# Patient Record
Sex: FEMALE | Race: WHITE | NOT HISPANIC OR LATINO
[De-identification: names, ages, dates, MRNs, and addresses within clinical notes are randomized per-mention and may not be internally consistent; named-entity substitution may affect disease eponyms.]

---

## 2017-11-01 ENCOUNTER — APPOINTMENT (OUTPATIENT)
Dept: OPHTHALMOLOGY | Facility: CLINIC | Age: 62
End: 2017-11-01
Payer: COMMERCIAL

## 2017-11-01 PROCEDURE — 92226: CPT | Mod: LT

## 2017-11-01 PROCEDURE — 92014 COMPRE OPH EXAM EST PT 1/>: CPT

## 2017-11-29 ENCOUNTER — APPOINTMENT (OUTPATIENT)
Dept: ORTHOPEDIC SURGERY | Facility: CLINIC | Age: 62
End: 2017-11-29
Payer: COMMERCIAL

## 2017-11-29 VITALS
WEIGHT: 171 LBS | HEIGHT: 69 IN | DIASTOLIC BLOOD PRESSURE: 78 MMHG | HEART RATE: 82 BPM | BODY MASS INDEX: 25.33 KG/M2 | SYSTOLIC BLOOD PRESSURE: 120 MMHG

## 2017-11-29 DIAGNOSIS — Z82.49 FAMILY HISTORY OF ISCHEMIC HEART DISEASE AND OTHER DISEASES OF THE CIRCULATORY SYSTEM: ICD-10-CM

## 2017-11-29 DIAGNOSIS — Z87.19 PERSONAL HISTORY OF OTHER DISEASES OF THE DIGESTIVE SYSTEM: ICD-10-CM

## 2017-11-29 DIAGNOSIS — Z78.9 OTHER SPECIFIED HEALTH STATUS: ICD-10-CM

## 2017-11-29 DIAGNOSIS — Z80.9 FAMILY HISTORY OF MALIGNANT NEOPLASM, UNSPECIFIED: ICD-10-CM

## 2017-11-29 DIAGNOSIS — Z87.891 PERSONAL HISTORY OF NICOTINE DEPENDENCE: ICD-10-CM

## 2017-11-29 DIAGNOSIS — Z87.09 PERSONAL HISTORY OF OTHER DISEASES OF THE RESPIRATORY SYSTEM: ICD-10-CM

## 2017-11-29 PROCEDURE — 99202 OFFICE O/P NEW SF 15 MIN: CPT

## 2017-11-29 PROCEDURE — 73650 X-RAY EXAM OF HEEL: CPT | Mod: RT

## 2017-11-29 RX ORDER — IBUPROFEN 800 MG
TABLET ORAL
Refills: 0 | Status: ACTIVE | COMMUNITY

## 2017-11-29 RX ORDER — OMEPRAZOLE 40 MG/1
CAPSULE, DELAYED RELEASE ORAL
Refills: 0 | Status: ACTIVE | COMMUNITY

## 2017-11-29 RX ORDER — ESTRADIOL 0.5 MG/.5G
0.5 GEL TOPICAL
Refills: 0 | Status: ACTIVE | COMMUNITY

## 2017-11-29 RX ORDER — ASPIRIN/ACETAMINOPHEN/CAFFEINE 250-250-65
250-250-65 TABLET ORAL
Refills: 0 | Status: ACTIVE | COMMUNITY

## 2018-06-13 ENCOUNTER — APPOINTMENT (OUTPATIENT)
Dept: ORTHOPEDIC SURGERY | Facility: CLINIC | Age: 63
End: 2018-06-13
Payer: COMMERCIAL

## 2018-06-13 PROCEDURE — 99214 OFFICE O/P EST MOD 30 MIN: CPT | Mod: 25

## 2018-06-13 PROCEDURE — 20610 DRAIN/INJ JOINT/BURSA W/O US: CPT | Mod: RT

## 2018-09-04 ENCOUNTER — APPOINTMENT (OUTPATIENT)
Dept: ORTHOPEDIC SURGERY | Facility: CLINIC | Age: 63
End: 2018-09-04
Payer: COMMERCIAL

## 2018-09-04 VITALS — WEIGHT: 175 LBS | BODY MASS INDEX: 25.92 KG/M2 | HEIGHT: 69 IN

## 2018-09-04 DIAGNOSIS — M76.61 ACHILLES TENDINITIS, RIGHT LEG: ICD-10-CM

## 2018-09-04 PROCEDURE — 20611 DRAIN/INJ JOINT/BURSA W/US: CPT | Mod: LT

## 2018-09-04 PROCEDURE — 99214 OFFICE O/P EST MOD 30 MIN: CPT | Mod: 25

## 2018-09-04 RX ORDER — VALACYCLOVIR 1 G/1
1 TABLET, FILM COATED ORAL
Qty: 60 | Refills: 0 | Status: ACTIVE | COMMUNITY
Start: 2017-11-13

## 2018-09-04 RX ORDER — PROGESTERONE 200 MG/1
200 CAPSULE ORAL
Qty: 18 | Refills: 0 | Status: ACTIVE | COMMUNITY
Start: 2017-11-13

## 2018-09-04 RX ORDER — ESTRADIOL 1 MG/G
1 GEL TOPICAL
Qty: 90 | Refills: 0 | Status: ACTIVE | COMMUNITY
Start: 2018-05-23

## 2018-09-04 RX ORDER — PNV NO.95/FERROUS FUM/FOLIC AC 28MG-0.8MG
TABLET ORAL
Refills: 0 | Status: ACTIVE | COMMUNITY

## 2018-09-04 RX ORDER — ALPRAZOLAM 0.5 MG/1
0.5 TABLET ORAL
Qty: 60 | Refills: 0 | Status: ACTIVE | COMMUNITY
Start: 2018-05-23

## 2018-09-04 RX ORDER — ALBUTEROL SULFATE 90 UG/1
108 (90 BASE) AEROSOL, METERED RESPIRATORY (INHALATION)
Qty: 8 | Refills: 0 | Status: ACTIVE | COMMUNITY
Start: 2018-05-23

## 2018-09-04 RX ORDER — MAGNESIUM OXIDE/MAG AA CHELATE 300 MG
CAPSULE ORAL
Refills: 0 | Status: ACTIVE | COMMUNITY

## 2018-09-04 RX ORDER — CHROMIUM 200 MCG
TABLET ORAL
Refills: 0 | Status: ACTIVE | COMMUNITY

## 2018-10-02 ENCOUNTER — APPOINTMENT (OUTPATIENT)
Dept: ORTHOPEDIC SURGERY | Facility: CLINIC | Age: 63
End: 2018-10-02
Payer: COMMERCIAL

## 2018-10-02 VITALS — HEIGHT: 69 IN | BODY MASS INDEX: 25.92 KG/M2 | WEIGHT: 175 LBS

## 2018-10-02 DIAGNOSIS — M75.102 UNSPECIFIED ROTATOR CUFF TEAR OR RUPTURE OF LEFT SHOULDER, NOT SPECIFIED AS TRAUMATIC: ICD-10-CM

## 2018-10-02 PROCEDURE — 99213 OFFICE O/P EST LOW 20 MIN: CPT

## 2019-05-08 ENCOUNTER — APPOINTMENT (OUTPATIENT)
Dept: ORTHOPEDIC SURGERY | Facility: CLINIC | Age: 64
End: 2019-05-08
Payer: COMMERCIAL

## 2019-05-08 PROCEDURE — 20610 DRAIN/INJ JOINT/BURSA W/O US: CPT | Mod: RT

## 2019-05-20 ENCOUNTER — FORM ENCOUNTER (OUTPATIENT)
Age: 64
End: 2019-05-20

## 2019-05-21 ENCOUNTER — OUTPATIENT (OUTPATIENT)
Dept: OUTPATIENT SERVICES | Facility: HOSPITAL | Age: 64
LOS: 1 days | End: 2019-05-21
Payer: COMMERCIAL

## 2019-05-21 ENCOUNTER — APPOINTMENT (OUTPATIENT)
Dept: ORTHOPEDIC SURGERY | Facility: CLINIC | Age: 64
End: 2019-05-21
Payer: COMMERCIAL

## 2019-05-21 VITALS
HEART RATE: 74 BPM | BODY MASS INDEX: 25.48 KG/M2 | DIASTOLIC BLOOD PRESSURE: 70 MMHG | HEIGHT: 69 IN | SYSTOLIC BLOOD PRESSURE: 110 MMHG | OXYGEN SATURATION: 98 % | WEIGHT: 172 LBS

## 2019-05-21 PROCEDURE — 99213 OFFICE O/P EST LOW 20 MIN: CPT

## 2019-05-21 PROCEDURE — 73564 X-RAY EXAM KNEE 4 OR MORE: CPT | Mod: 26,50

## 2019-05-21 PROCEDURE — 73564 X-RAY EXAM KNEE 4 OR MORE: CPT

## 2019-05-21 RX ORDER — MONTELUKAST 10 MG/1
10 TABLET, FILM COATED ORAL
Qty: 90 | Refills: 0 | Status: ACTIVE | COMMUNITY
Start: 2018-12-12

## 2019-05-25 ENCOUNTER — TRANSCRIPTION ENCOUNTER (OUTPATIENT)
Age: 64
End: 2019-05-25

## 2019-06-03 NOTE — DISCUSSION/SUMMARY
[de-identified] : July has persistent pain and swelling despite a cortisone  injection. She will be sent for an MRI to r.o a medial meniscus tear. We will call her with the results. all questions were answered. She will call if any issues arise.

## 2019-06-03 NOTE — HISTORY OF PRESENT ILLNESS
[de-identified] : July returns today for evalaution of her right knee. 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 She had a cortisone injection about three weeks ago. She had some initial relief from the injection but still has persistent right knee pain and stiffness. Her pain is more localized to the medial joint line. She has decreased ROM and pain with stairs. She denies any locking or buckling. She is unable to exercise and has difficulty walking long distances.

## 2019-06-03 NOTE — PHYSICAL EXAM
[de-identified] : The patient is a well developed, well nourished female in no apparent distress. She is alert and oriented X 3 with a pleasant mood and appropriate affect. \par \par On physical examination of the right knee, her ROM is 0-120 degrees. The patient walks with a normal gait and stands in neutral alignment. There is trace effusion. No warmth or erythema is noted. The patella is non tender to palpation medially or laterally. There is no crepitus noted. The apprehension and grind tests are negative. The extensor mechanism is intact. There is medial joint line tenderness. The Shonda sign is positive. The Lachman and pivot shift tests are negative. There is no varus or valgus laxity at 0 or 30 degrees. No posterolateral or anteromedial laxity is noted. No masses are palpable. No other soft tissue or bony tenderness is noted. Quadriceps weakness is noted. Neurovascular function is intact.   [de-identified] : Radiographs performed today show mild PF DJd in her right knee

## 2019-06-03 NOTE — END OF VISIT
[FreeTextEntry3] : All medical record entries made by RA Julian, acting as a scribe for this encounter under the direction of Louis Mccord MD . I have reviewed the chart and agree that the record accurately reflects my personal performance of the history, physical exam, assessment and plan. I have also personally directed, reviewed, and agreed with the chart.

## 2019-10-09 ENCOUNTER — APPOINTMENT (OUTPATIENT)
Dept: ORTHOPEDIC SURGERY | Facility: CLINIC | Age: 64
End: 2019-10-09
Payer: COMMERCIAL

## 2019-10-09 PROCEDURE — 99213 OFFICE O/P EST LOW 20 MIN: CPT | Mod: 25

## 2019-10-09 PROCEDURE — 20610 DRAIN/INJ JOINT/BURSA W/O US: CPT | Mod: RT

## 2019-10-14 NOTE — DISCUSSION/SUMMARY
[de-identified] : July has effusion and anterior knee pain. She received a cortisone injection today. She will slowly increase her activities as tolerated. She will call if any issues arise.

## 2019-10-14 NOTE — PHYSICAL EXAM
[de-identified] : The patient is a well developed, well nourished female in no apparent distress. She is alert and oriented X 3 with a pleasant mood and appropriate affect. \par \par On physical examination of the right knee, her ROM is 0-120 degrees. The patient walks with a normal gait and stands in neutral alignment. There is trace effusion. No warmth or erythema is noted. The patella is non tender to palpation medially or laterally. There is no crepitus noted. The apprehension and grind tests are negative. The extensor mechanism is intact. There is medial joint line tenderness. The Shonda sign is positive. The Lachman and pivot shift tests are negative. There is no varus or valgus laxity at 0 or 30 degrees. No posterolateral or anteromedial laxity is noted. No masses are palpable. No other soft tissue or bony tenderness is noted. Quadriceps weakness is noted. Neurovascular function is intact.

## 2019-10-14 NOTE — PROCEDURE
[de-identified] : \par \par Under strict sterile technique, the right knee was prepped with Betadine. Using the superolateral approach, with the patient supine, 1ml of Kenalog was mixed with 5mls of 1% Lidocaine and 5mLs of 0.5% Marcaine, and was injected intraarticularly. The patient tolerated the procedure well. The patient was instructed to avoid vigorous exercise for 24 hours and will apply ice to the knee for 20 minutes 2-3 times per day if discomfort occurs. Patient will return on an as needed basis. The patient will call if any questions or problems should arise

## 2019-10-14 NOTE — HISTORY OF PRESENT ILLNESS
[de-identified] : July returns today for evalaution of her right knee. 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 She reports a recent increase in anterior knee pain and stiffness. She has had intermittent swelling. . She has decreased ROM and pain with stairs. She denies any locking or buckling. She is unable to exercise and has difficulty walking long distances.

## 2019-11-25 ENCOUNTER — RX RENEWAL (OUTPATIENT)
Age: 64
End: 2019-11-25

## 2019-12-09 ENCOUNTER — RX RENEWAL (OUTPATIENT)
Age: 64
End: 2019-12-09

## 2019-12-09 DIAGNOSIS — M17.11 UNILATERAL PRIMARY OSTEOARTHRITIS, RIGHT KNEE: ICD-10-CM

## 2019-12-23 PROBLEM — M17.11 OSTEOARTHRITIS OF RIGHT PATELLOFEMORAL JOINT: Status: ACTIVE | Noted: 2018-06-19

## 2020-12-04 ENCOUNTER — APPOINTMENT (OUTPATIENT)
Dept: ORTHOPEDIC SURGERY | Facility: CLINIC | Age: 65
End: 2020-12-04
Payer: COMMERCIAL

## 2020-12-04 DIAGNOSIS — M75.01 ADHESIVE CAPSULITIS OF RIGHT SHOULDER: ICD-10-CM

## 2020-12-04 DIAGNOSIS — M75.41 IMPINGEMENT SYNDROME OF RIGHT SHOULDER: ICD-10-CM

## 2020-12-04 PROCEDURE — 20611 DRAIN/INJ JOINT/BURSA W/US: CPT | Mod: RT

## 2020-12-04 PROCEDURE — 99072 ADDL SUPL MATRL&STAF TM PHE: CPT

## 2020-12-04 PROCEDURE — 99212 OFFICE O/P EST SF 10 MIN: CPT | Mod: 25
